# Patient Record
Sex: FEMALE
[De-identification: names, ages, dates, MRNs, and addresses within clinical notes are randomized per-mention and may not be internally consistent; named-entity substitution may affect disease eponyms.]

---

## 2019-08-01 ENCOUNTER — TRANSCRIPTION ENCOUNTER (OUTPATIENT)
Age: 24
End: 2019-08-01

## 2020-01-17 ENCOUNTER — TRANSCRIPTION ENCOUNTER (OUTPATIENT)
Age: 25
End: 2020-01-17

## 2021-06-28 ENCOUNTER — TRANSCRIPTION ENCOUNTER (OUTPATIENT)
Age: 26
End: 2021-06-28

## 2023-06-22 PROBLEM — Z00.00 ENCOUNTER FOR PREVENTIVE HEALTH EXAMINATION: Status: ACTIVE | Noted: 2023-06-22

## 2023-06-29 ENCOUNTER — APPOINTMENT (OUTPATIENT)
Dept: ORTHOPEDIC SURGERY | Facility: CLINIC | Age: 28
End: 2023-06-29
Payer: COMMERCIAL

## 2023-06-29 VITALS — HEIGHT: 64 IN | WEIGHT: 133 LBS | BODY MASS INDEX: 22.71 KG/M2

## 2023-06-29 DIAGNOSIS — M65.4 RADIAL STYLOID TENOSYNOVITIS [DE QUERVAIN]: ICD-10-CM

## 2023-06-29 DIAGNOSIS — Z78.9 OTHER SPECIFIED HEALTH STATUS: ICD-10-CM

## 2023-06-29 PROCEDURE — 99203 OFFICE O/P NEW LOW 30 MIN: CPT | Mod: 25

## 2023-06-29 PROCEDURE — 20550 NJX 1 TENDON SHEATH/LIGAMENT: CPT | Mod: RT

## 2023-06-29 PROCEDURE — J3490M: CUSTOM

## 2023-06-29 NOTE — HISTORY OF PRESENT ILLNESS
[5] : 5 [Sharp] : sharp [Ice] : ice [Heat] : heat [de-identified] : R radial sided wrist pain  [] : no [FreeTextEntry1] : R wrist [FreeTextEntry3] : few months ago [FreeTextEntry5] : no injury. she is active with weights [de-identified] : activity

## 2023-06-29 NOTE — ASSESSMENT
[FreeTextEntry1] : R First dorsal compartment tendon sheath injection was performed because of pain inflammation and stiffness\par Anesthesia: ethyl chloride sprayed topically\par Celestone 6mg: An injection of Celestone 1cc\par Lidocaine: An injection of Lidocaine 1% 1cc\par Marcaine: An injection of Marcaine 0.5% 1cc\par \par Patient has tried OTC's including aspirin, Ibuprofen, Aleve etc or prescription NSAIDS, and/or exercises at home and/ or\par physical therapy without satisfactory response.\par After verbal consent using sterile preparation and technique. The risks, benefits, and alternatives to cortisone injection\par were explained in full to the patient. Risks outlined include but are not limited to infection, sepsis, bleeding, scarring, skin\par discoloration, temporary increase in pain, syncopal episode, failure to resolve symptoms, allergic reaction, symptom\par recurrence, and elevation of blood sugar in diabetics. Patient understood the risks. All questions were answered. After\par discussion of options, patient requested an injection. Oral informed consent was obtained and sterile prep was done of the\par injection site. Sterile technique was utilized for the procedure including the preparation of the solutions used for the\par injection. Patient tolerated the procedure well. Advised to ice the injection site this evening.\par Prep with betadine locally to site. Sterile technique used

## 2023-06-29 NOTE — PHYSICAL EXAM
[de-identified] : R wrist\par Mild swelling\par Tender first dorsal comp\par Decreased thumb and wrist ROM\par +Finklesteins\par